# Patient Record
Sex: MALE | Race: WHITE | NOT HISPANIC OR LATINO | Employment: FULL TIME | URBAN - METROPOLITAN AREA
[De-identification: names, ages, dates, MRNs, and addresses within clinical notes are randomized per-mention and may not be internally consistent; named-entity substitution may affect disease eponyms.]

---

## 2022-07-13 ENCOUNTER — HOSPITAL ENCOUNTER (INPATIENT)
Facility: CLINIC | Age: 42
LOS: 2 days | Discharge: HOME | DRG: 897 | End: 2022-07-15
Attending: EMERGENCY MEDICINE | Admitting: INTERNAL MEDICINE
Payer: COMMERCIAL

## 2022-07-13 DIAGNOSIS — K14.8 EDEMA OF THE TONGUE: ICD-10-CM

## 2022-07-13 DIAGNOSIS — S01.512A TONGUE LACERATION, INITIAL ENCOUNTER: ICD-10-CM

## 2022-07-13 DIAGNOSIS — R56.9: ICD-10-CM

## 2022-07-13 DIAGNOSIS — F10.939: ICD-10-CM

## 2022-07-13 LAB
ALBUMIN SERPL-MCNC: 3.7 G/DL (ref 3.4–5)
ALP SERPL-CCNC: 64 U/L (ref 40–150)
ALT SERPL W P-5'-P-CCNC: 159 U/L (ref 0–70)
AMPHETAMINES UR QL SCN: NORMAL
ANION GAP SERPL CALCULATED.3IONS-SCNC: 11 MMOL/L (ref 3–14)
AST SERPL W P-5'-P-CCNC: 271 U/L (ref 0–45)
BARBITURATES UR QL: NORMAL
BASOPHILS # BLD AUTO: 0 10E3/UL (ref 0–0.2)
BASOPHILS NFR BLD AUTO: 1 %
BENZODIAZ UR QL: NORMAL
BILIRUB SERPL-MCNC: 2.3 MG/DL (ref 0.2–1.3)
BUN SERPL-MCNC: 6 MG/DL (ref 7–30)
CALCIUM SERPL-MCNC: 9 MG/DL (ref 8.5–10.1)
CANNABINOIDS UR QL SCN: NORMAL
CHLORIDE BLD-SCNC: 98 MMOL/L (ref 94–109)
CO2 SERPL-SCNC: 24 MMOL/L (ref 20–32)
COCAINE UR QL: NORMAL
CREAT SERPL-MCNC: 0.68 MG/DL (ref 0.66–1.25)
EOSINOPHIL # BLD AUTO: 0 10E3/UL (ref 0–0.7)
EOSINOPHIL NFR BLD AUTO: 1 %
ERYTHROCYTE [DISTWIDTH] IN BLOOD BY AUTOMATED COUNT: 13 % (ref 10–15)
ETHANOL SERPL-MCNC: <0.01 G/DL
GFR SERPL CREATININE-BSD FRML MDRD: >90 ML/MIN/1.73M2
GLUCOSE BLD-MCNC: 129 MG/DL (ref 70–99)
HCT VFR BLD AUTO: 40.4 % (ref 40–53)
HGB BLD-MCNC: 14.5 G/DL (ref 13.3–17.7)
IMM GRANULOCYTES # BLD: 0 10E3/UL
IMM GRANULOCYTES NFR BLD: 0 %
LYMPHOCYTES # BLD AUTO: 0.4 10E3/UL (ref 0.8–5.3)
LYMPHOCYTES NFR BLD AUTO: 11 %
MAGNESIUM SERPL-MCNC: 1.5 MG/DL (ref 1.6–2.3)
MAGNESIUM SERPL-MCNC: 1.9 MG/DL (ref 1.6–2.3)
MCH RBC QN AUTO: 34.3 PG (ref 26.5–33)
MCHC RBC AUTO-ENTMCNC: 35.9 G/DL (ref 31.5–36.5)
MCV RBC AUTO: 96 FL (ref 78–100)
MONOCYTES # BLD AUTO: 0.6 10E3/UL (ref 0–1.3)
MONOCYTES NFR BLD AUTO: 16 %
NEUTROPHILS # BLD AUTO: 2.8 10E3/UL (ref 1.6–8.3)
NEUTROPHILS NFR BLD AUTO: 71 %
NRBC # BLD AUTO: 0 10E3/UL
NRBC BLD AUTO-RTO: 0 /100
OPIATES UR QL SCN: NORMAL
PCP UR QL SCN: NORMAL
PHOSPHATE SERPL-MCNC: 1.9 MG/DL (ref 2.5–4.5)
PHOSPHATE SERPL-MCNC: 2.5 MG/DL (ref 2.5–4.5)
PLATELET # BLD AUTO: 72 10E3/UL (ref 150–450)
POTASSIUM BLD-SCNC: 2.9 MMOL/L (ref 3.4–5.3)
POTASSIUM BLD-SCNC: 3.3 MMOL/L (ref 3.4–5.3)
PROT SERPL-MCNC: 8.2 G/DL (ref 6.8–8.8)
RBC # BLD AUTO: 4.23 10E6/UL (ref 4.4–5.9)
SARS-COV-2 RNA RESP QL NAA+PROBE: NEGATIVE
SODIUM SERPL-SCNC: 133 MMOL/L (ref 133–144)
WBC # BLD AUTO: 3.9 10E3/UL (ref 4–11)

## 2022-07-13 PROCEDURE — 83735 ASSAY OF MAGNESIUM: CPT | Performed by: EMERGENCY MEDICINE

## 2022-07-13 PROCEDURE — 99285 EMERGENCY DEPT VISIT HI MDM: CPT

## 2022-07-13 PROCEDURE — 80307 DRUG TEST PRSMV CHEM ANLYZR: CPT | Performed by: EMERGENCY MEDICINE

## 2022-07-13 PROCEDURE — 258N000003 HC RX IP 258 OP 636: Performed by: PHYSICIAN ASSISTANT

## 2022-07-13 PROCEDURE — 250N000013 HC RX MED GY IP 250 OP 250 PS 637: Performed by: PHYSICIAN ASSISTANT

## 2022-07-13 PROCEDURE — 250N000011 HC RX IP 250 OP 636: Performed by: PHYSICIAN ASSISTANT

## 2022-07-13 PROCEDURE — 84100 ASSAY OF PHOSPHORUS: CPT | Performed by: PHYSICIAN ASSISTANT

## 2022-07-13 PROCEDURE — 250N000011 HC RX IP 250 OP 636: Performed by: EMERGENCY MEDICINE

## 2022-07-13 PROCEDURE — HZ2ZZZZ DETOXIFICATION SERVICES FOR SUBSTANCE ABUSE TREATMENT: ICD-10-PCS | Performed by: PHYSICIAN ASSISTANT

## 2022-07-13 PROCEDURE — 85025 COMPLETE CBC W/AUTO DIFF WBC: CPT | Performed by: EMERGENCY MEDICINE

## 2022-07-13 PROCEDURE — 250N000009 HC RX 250: Performed by: PHYSICIAN ASSISTANT

## 2022-07-13 PROCEDURE — 250N000013 HC RX MED GY IP 250 OP 250 PS 637: Performed by: INTERNAL MEDICINE

## 2022-07-13 PROCEDURE — U0005 INFEC AGEN DETEC AMPLI PROBE: HCPCS | Performed by: EMERGENCY MEDICINE

## 2022-07-13 PROCEDURE — 83735 ASSAY OF MAGNESIUM: CPT | Performed by: PHYSICIAN ASSISTANT

## 2022-07-13 PROCEDURE — 84132 ASSAY OF SERUM POTASSIUM: CPT | Performed by: PHYSICIAN ASSISTANT

## 2022-07-13 PROCEDURE — 120N000001 HC R&B MED SURG/OB

## 2022-07-13 PROCEDURE — 80053 COMPREHEN METABOLIC PANEL: CPT | Performed by: EMERGENCY MEDICINE

## 2022-07-13 PROCEDURE — 36415 COLL VENOUS BLD VENIPUNCTURE: CPT | Performed by: PHYSICIAN ASSISTANT

## 2022-07-13 PROCEDURE — C9803 HOPD COVID-19 SPEC COLLECT: HCPCS

## 2022-07-13 PROCEDURE — 96365 THER/PROPH/DIAG IV INF INIT: CPT

## 2022-07-13 PROCEDURE — 99223 1ST HOSP IP/OBS HIGH 75: CPT | Mod: AI | Performed by: PHYSICIAN ASSISTANT

## 2022-07-13 PROCEDURE — 84100 ASSAY OF PHOSPHORUS: CPT | Performed by: EMERGENCY MEDICINE

## 2022-07-13 PROCEDURE — 96375 TX/PRO/DX INJ NEW DRUG ADDON: CPT

## 2022-07-13 PROCEDURE — 96366 THER/PROPH/DIAG IV INF ADDON: CPT

## 2022-07-13 PROCEDURE — 82077 ASSAY SPEC XCP UR&BREATH IA: CPT | Performed by: EMERGENCY MEDICINE

## 2022-07-13 PROCEDURE — 250N000013 HC RX MED GY IP 250 OP 250 PS 637: Performed by: EMERGENCY MEDICINE

## 2022-07-13 PROCEDURE — 36415 COLL VENOUS BLD VENIPUNCTURE: CPT | Performed by: EMERGENCY MEDICINE

## 2022-07-13 PROCEDURE — 258N000003 HC RX IP 258 OP 636: Performed by: EMERGENCY MEDICINE

## 2022-07-13 PROCEDURE — 96376 TX/PRO/DX INJ SAME DRUG ADON: CPT

## 2022-07-13 PROCEDURE — 250N000009 HC RX 250: Performed by: EMERGENCY MEDICINE

## 2022-07-13 RX ORDER — CYANOCOBALAMIN (VITAMIN B-12) 500 MCG
TABLET ORAL
COMMUNITY
End: 2022-07-13

## 2022-07-13 RX ORDER — CALCIUM CARBONATE 500 MG/1
1000 TABLET, CHEWABLE ORAL 4 TIMES DAILY PRN
Status: DISCONTINUED | OUTPATIENT
Start: 2022-07-13 | End: 2022-07-15 | Stop reason: HOSPADM

## 2022-07-13 RX ORDER — OLANZAPINE 5 MG/1
5-10 TABLET, ORALLY DISINTEGRATING ORAL EVERY 6 HOURS PRN
Status: DISCONTINUED | OUTPATIENT
Start: 2022-07-13 | End: 2022-07-15 | Stop reason: HOSPADM

## 2022-07-13 RX ORDER — DIAZEPAM 5 MG
10 TABLET ORAL EVERY 30 MIN PRN
Status: DISCONTINUED | OUTPATIENT
Start: 2022-07-13 | End: 2022-07-15 | Stop reason: HOSPADM

## 2022-07-13 RX ORDER — ACETAMINOPHEN 325 MG/1
650 TABLET ORAL 2 TIMES DAILY PRN
COMMUNITY

## 2022-07-13 RX ORDER — FOLIC ACID 1 MG/1
2 TABLET ORAL DAILY
COMMUNITY

## 2022-07-13 RX ORDER — DIAZEPAM 10 MG/2ML
5-10 INJECTION, SOLUTION INTRAMUSCULAR; INTRAVENOUS EVERY 30 MIN PRN
Status: DISCONTINUED | OUTPATIENT
Start: 2022-07-13 | End: 2022-07-15 | Stop reason: HOSPADM

## 2022-07-13 RX ORDER — LANOLIN ALCOHOL/MO/W.PET/CERES
1 CREAM (GRAM) TOPICAL
COMMUNITY

## 2022-07-13 RX ORDER — ESCITALOPRAM OXALATE 10 MG/1
10 TABLET ORAL DAILY
COMMUNITY

## 2022-07-13 RX ORDER — CHLORAL HYDRATE 500 MG
CAPSULE ORAL
COMMUNITY

## 2022-07-13 RX ORDER — GABAPENTIN 300 MG/1
900 CAPSULE ORAL EVERY 8 HOURS
Status: DISCONTINUED | OUTPATIENT
Start: 2022-07-13 | End: 2022-07-15 | Stop reason: HOSPADM

## 2022-07-13 RX ORDER — FOLIC ACID 1 MG/1
1 TABLET ORAL DAILY
Status: DISCONTINUED | OUTPATIENT
Start: 2022-07-14 | End: 2022-07-15 | Stop reason: HOSPADM

## 2022-07-13 RX ORDER — LANOLIN ALCOHOL/MO/W.PET/CERES
100 CREAM (GRAM) TOPICAL 2 TIMES DAILY
COMMUNITY

## 2022-07-13 RX ORDER — ACETAMINOPHEN 325 MG/1
650 TABLET ORAL EVERY 6 HOURS PRN
Status: DISCONTINUED | OUTPATIENT
Start: 2022-07-13 | End: 2022-07-15 | Stop reason: HOSPADM

## 2022-07-13 RX ORDER — ACETAMINOPHEN 650 MG/1
650 SUPPOSITORY RECTAL EVERY 6 HOURS PRN
Status: DISCONTINUED | OUTPATIENT
Start: 2022-07-13 | End: 2022-07-15 | Stop reason: HOSPADM

## 2022-07-13 RX ORDER — PROCHLORPERAZINE 25 MG
25 SUPPOSITORY, RECTAL RECTAL EVERY 12 HOURS PRN
Status: DISCONTINUED | OUTPATIENT
Start: 2022-07-13 | End: 2022-07-15 | Stop reason: HOSPADM

## 2022-07-13 RX ORDER — TRAZODONE HYDROCHLORIDE 50 MG/1
50 TABLET, FILM COATED ORAL AT BEDTIME
COMMUNITY

## 2022-07-13 RX ORDER — POTASSIUM CHLORIDE 1500 MG/1
40 TABLET, EXTENDED RELEASE ORAL ONCE
Status: COMPLETED | OUTPATIENT
Start: 2022-07-13 | End: 2022-07-13

## 2022-07-13 RX ORDER — HALOPERIDOL 5 MG/ML
2.5-5 INJECTION INTRAMUSCULAR EVERY 6 HOURS PRN
Status: DISCONTINUED | OUTPATIENT
Start: 2022-07-13 | End: 2022-07-15 | Stop reason: HOSPADM

## 2022-07-13 RX ORDER — MULTIPLE VITAMINS W/ MINERALS TAB 9MG-400MCG
1 TAB ORAL DAILY
Status: DISCONTINUED | OUTPATIENT
Start: 2022-07-14 | End: 2022-07-15 | Stop reason: HOSPADM

## 2022-07-13 RX ORDER — VITAMIN E 268 MG
400 CAPSULE ORAL DAILY
COMMUNITY

## 2022-07-13 RX ORDER — HYDRALAZINE HYDROCHLORIDE 20 MG/ML
10 INJECTION INTRAMUSCULAR; INTRAVENOUS EVERY 4 HOURS PRN
Status: DISCONTINUED | OUTPATIENT
Start: 2022-07-13 | End: 2022-07-15 | Stop reason: HOSPADM

## 2022-07-13 RX ORDER — GABAPENTIN 400 MG/1
400 CAPSULE ORAL 3 TIMES DAILY
COMMUNITY
End: 2022-07-13

## 2022-07-13 RX ORDER — ONDANSETRON 4 MG/1
4 TABLET, ORALLY DISINTEGRATING ORAL EVERY 6 HOURS PRN
Status: DISCONTINUED | OUTPATIENT
Start: 2022-07-13 | End: 2022-07-15 | Stop reason: HOSPADM

## 2022-07-13 RX ORDER — GABAPENTIN 100 MG/1
100 CAPSULE ORAL EVERY 8 HOURS
Status: DISCONTINUED | OUTPATIENT
Start: 2022-07-20 | End: 2022-07-15

## 2022-07-13 RX ORDER — DIAZEPAM 10 MG/2ML
5 INJECTION, SOLUTION INTRAMUSCULAR; INTRAVENOUS EVERY 5 MIN PRN
Status: DISCONTINUED | OUTPATIENT
Start: 2022-07-13 | End: 2022-07-13

## 2022-07-13 RX ORDER — GABAPENTIN 300 MG/1
300 CAPSULE ORAL EVERY 8 HOURS
Status: DISCONTINUED | OUTPATIENT
Start: 2022-07-18 | End: 2022-07-15

## 2022-07-13 RX ORDER — CLONIDINE HYDROCHLORIDE 0.1 MG/1
0.1 TABLET ORAL EVERY 8 HOURS
Status: DISCONTINUED | OUTPATIENT
Start: 2022-07-13 | End: 2022-07-15

## 2022-07-13 RX ORDER — POTASSIUM CHLORIDE 1.5 G/1.58G
40 POWDER, FOR SOLUTION ORAL ONCE
Status: COMPLETED | OUTPATIENT
Start: 2022-07-13 | End: 2022-07-13

## 2022-07-13 RX ORDER — SODIUM CHLORIDE AND POTASSIUM CHLORIDE 150; 900 MG/100ML; MG/100ML
INJECTION, SOLUTION INTRAVENOUS CONTINUOUS
Status: DISCONTINUED | OUTPATIENT
Start: 2022-07-13 | End: 2022-07-15

## 2022-07-13 RX ORDER — LISINOPRIL 10 MG/1
10 TABLET ORAL DAILY
COMMUNITY

## 2022-07-13 RX ORDER — ACAMPROSATE CALCIUM 333 MG/1
666 TABLET, DELAYED RELEASE ORAL 3 TIMES DAILY
Status: ON HOLD | COMMUNITY
End: 2022-07-15

## 2022-07-13 RX ORDER — SILDENAFIL 100 MG/1
100 TABLET, FILM COATED ORAL DAILY PRN
COMMUNITY

## 2022-07-13 RX ORDER — AMOXICILLIN 250 MG
2 CAPSULE ORAL 2 TIMES DAILY PRN
Status: DISCONTINUED | OUTPATIENT
Start: 2022-07-13 | End: 2022-07-15 | Stop reason: HOSPADM

## 2022-07-13 RX ORDER — PANTOPRAZOLE SODIUM 40 MG/1
40 TABLET, DELAYED RELEASE ORAL DAILY
COMMUNITY

## 2022-07-13 RX ORDER — LABETALOL HYDROCHLORIDE 5 MG/ML
10 INJECTION, SOLUTION INTRAVENOUS
Status: DISCONTINUED | OUTPATIENT
Start: 2022-07-13 | End: 2022-07-15 | Stop reason: HOSPADM

## 2022-07-13 RX ORDER — ONDANSETRON 2 MG/ML
4 INJECTION INTRAMUSCULAR; INTRAVENOUS EVERY 6 HOURS PRN
Status: DISCONTINUED | OUTPATIENT
Start: 2022-07-13 | End: 2022-07-15 | Stop reason: HOSPADM

## 2022-07-13 RX ORDER — UBIDECARENONE 100 MG
100 CAPSULE ORAL
COMMUNITY

## 2022-07-13 RX ORDER — MULTIVITAMIN WITH IRON
1 TABLET ORAL DAILY
COMMUNITY
End: 2022-07-13

## 2022-07-13 RX ORDER — PROCHLORPERAZINE MALEATE 10 MG
10 TABLET ORAL EVERY 6 HOURS PRN
Status: DISCONTINUED | OUTPATIENT
Start: 2022-07-13 | End: 2022-07-15 | Stop reason: HOSPADM

## 2022-07-13 RX ORDER — MAGNESIUM OXIDE 400 MG/1
400 TABLET ORAL 2 TIMES DAILY
COMMUNITY

## 2022-07-13 RX ORDER — FLUMAZENIL 0.1 MG/ML
0.2 INJECTION, SOLUTION INTRAVENOUS
Status: DISCONTINUED | OUTPATIENT
Start: 2022-07-13 | End: 2022-07-15 | Stop reason: HOSPADM

## 2022-07-13 RX ORDER — GABAPENTIN 600 MG/1
1200 TABLET ORAL ONCE
Status: COMPLETED | OUTPATIENT
Start: 2022-07-13 | End: 2022-07-13

## 2022-07-13 RX ORDER — ATORVASTATIN CALCIUM 20 MG/1
20 TABLET, FILM COATED ORAL DAILY
COMMUNITY

## 2022-07-13 RX ORDER — PANTOPRAZOLE SODIUM 40 MG/1
40 TABLET, DELAYED RELEASE ORAL
Status: DISCONTINUED | OUTPATIENT
Start: 2022-07-13 | End: 2022-07-15 | Stop reason: HOSPADM

## 2022-07-13 RX ORDER — LIDOCAINE 40 MG/G
CREAM TOPICAL
Status: DISCONTINUED | OUTPATIENT
Start: 2022-07-13 | End: 2022-07-15 | Stop reason: HOSPADM

## 2022-07-13 RX ORDER — NICOTINE 21 MG/24HR
1 PATCH, TRANSDERMAL 24 HOURS TRANSDERMAL DAILY
Status: DISCONTINUED | OUTPATIENT
Start: 2022-07-13 | End: 2022-07-15 | Stop reason: HOSPADM

## 2022-07-13 RX ORDER — POTASSIUM CHLORIDE 1500 MG/1
20 TABLET, EXTENDED RELEASE ORAL DAILY
COMMUNITY

## 2022-07-13 RX ORDER — GABAPENTIN 300 MG/1
600 CAPSULE ORAL EVERY 8 HOURS
Status: DISCONTINUED | OUTPATIENT
Start: 2022-07-16 | End: 2022-07-15 | Stop reason: HOSPADM

## 2022-07-13 RX ORDER — AMOXICILLIN 250 MG
1 CAPSULE ORAL 2 TIMES DAILY PRN
Status: DISCONTINUED | OUTPATIENT
Start: 2022-07-13 | End: 2022-07-15 | Stop reason: HOSPADM

## 2022-07-13 RX ADMIN — GABAPENTIN 900 MG: 300 CAPSULE ORAL at 22:12

## 2022-07-13 RX ADMIN — DIAZEPAM 5 MG: 5 INJECTION, SOLUTION INTRAMUSCULAR; INTRAVENOUS at 14:20

## 2022-07-13 RX ADMIN — DIAZEPAM 5 MG: 5 INJECTION, SOLUTION INTRAMUSCULAR; INTRAVENOUS at 15:53

## 2022-07-13 RX ADMIN — SENNOSIDES AND DOCUSATE SODIUM 2 TABLET: 50; 8.6 TABLET ORAL at 19:52

## 2022-07-13 RX ADMIN — POTASSIUM CHLORIDE 40 MEQ: 1500 TABLET, EXTENDED RELEASE ORAL at 19:52

## 2022-07-13 RX ADMIN — NICOTINE 1 PATCH: 14 PATCH, EXTENDED RELEASE TRANSDERMAL at 15:02

## 2022-07-13 RX ADMIN — FAMOTIDINE 20 MG: 10 INJECTION INTRAVENOUS at 14:30

## 2022-07-13 RX ADMIN — POTASSIUM CHLORIDE 40 MEQ: 1.5 POWDER, FOR SOLUTION ORAL at 18:05

## 2022-07-13 RX ADMIN — CLONIDINE HYDROCHLORIDE 0.1 MG: 0.1 TABLET ORAL at 19:52

## 2022-07-13 RX ADMIN — MELATONIN 5 MG TABLET 5 MG: at 22:13

## 2022-07-13 RX ADMIN — POTASSIUM CHLORIDE AND SODIUM CHLORIDE: 900; 150 INJECTION, SOLUTION INTRAVENOUS at 17:47

## 2022-07-13 RX ADMIN — DIAZEPAM 5 MG: 5 INJECTION, SOLUTION INTRAMUSCULAR; INTRAVENOUS at 10:05

## 2022-07-13 RX ADMIN — GABAPENTIN 1200 MG: 600 TABLET, FILM COATED ORAL at 18:05

## 2022-07-13 RX ADMIN — THIAMINE HYDROCHLORIDE: 100 INJECTION, SOLUTION INTRAMUSCULAR; INTRAVENOUS at 10:41

## 2022-07-13 RX ADMIN — ACETAMINOPHEN 650 MG: 325 TABLET ORAL at 22:13

## 2022-07-13 RX ADMIN — DIAZEPAM 10 MG: 5 TABLET ORAL at 18:05

## 2022-07-13 RX ADMIN — THIAMINE HYDROCHLORIDE: 100 INJECTION, SOLUTION INTRAMUSCULAR; INTRAVENOUS at 18:40

## 2022-07-13 RX ADMIN — PANTOPRAZOLE SODIUM 40 MG: 40 TABLET, DELAYED RELEASE ORAL at 18:05

## 2022-07-13 ASSESSMENT — ACTIVITIES OF DAILY LIVING (ADL)
WALKING_OR_CLIMBING_STAIRS_DIFFICULTY: NO
ADLS_ACUITY_SCORE: 35
ADLS_ACUITY_SCORE: 22
NUMBER_OF_TIMES_PATIENT_HAS_FALLEN_WITHIN_LAST_SIX_MONTHS: 0
DOING_ERRANDS_INDEPENDENTLY_DIFFICULTY: NO
VISION_MANAGEMENT: GLASSES
ADLS_ACUITY_SCORE: 35
DIFFICULTY_EATING/SWALLOWING: NO
DRESSING/BATHING_DIFFICULTY: NO
DIFFICULTY_COMMUNICATING: NO
HEARING_DIFFICULTY_OR_DEAF: NO
ADLS_ACUITY_SCORE: 22
ADLS_ACUITY_SCORE: 35
WEAR_GLASSES_OR_BLIND: YES
FALL_HISTORY_WITHIN_LAST_SIX_MONTHS: NO
CHANGE_IN_FUNCTIONAL_STATUS_SINCE_ONSET_OF_CURRENT_ILLNESS/INJURY: NO
TOILETING_ISSUES: NO
ADLS_ACUITY_SCORE: 22
ADLS_ACUITY_SCORE: 35
CONCENTRATING,_REMEMBERING_OR_MAKING_DECISIONS_DIFFICULTY: NO

## 2022-07-13 ASSESSMENT — ENCOUNTER SYMPTOMS
HEADACHES: 1
SEIZURES: 1

## 2022-07-13 NOTE — ED TRIAGE NOTES
Witnessed 1-2min tonic clonic seizure at work. Pt is postictal currently and unable to give clear answers. Pt from [place], Wife notified.

## 2022-07-13 NOTE — ED PROVIDER NOTES
History   Chief Complaint:  Seizures       HPI   Noble Carty is a 41 year old male with history of alcohol withdrawal seizures who presents with seizures. Per the patient, he remembers waking up on the floor. He bit his tongue which is now swollen. He does not think he hit his head but reports a bad headache. He his here on business from [place]. Per his wife over the phone, this is his second alcohol related seizure. The last being in September 2021. He had been drinking at home but stopped when he travelled here.     Wife: Janneth [phone]    Review of Systems   Skin:        Positive for swelling of tongue.    Neurological: Positive for seizures and headaches.   All other systems reviewed and are negative.      Allergies:  The patient has no known allergies.     Medications:  Acamprosate  Atorvastatin  Folic Acid  Gabapentin  Potassium Aminobenzoate     Past Medical History:     Alcohol Abuse  Alcohol Withdrawal Seizures   Alcohol Hepatitis  Coronary Artery Calcification  Fatty Liver  GERD  Hepatic Steatosis  Hepatomegaly  Hyperlipidemia  Hypertension  Hypercalcemia   Hypokalemia  PEARL  Prediabetes  Pulmonary Granuloma  Severe Obestity   Substance Use Disorder  Throbocytopenia    Past Surgical History:  Nasal Septum Surgery    Past Family History  CAD, MI - Father    Social History:  Patient is from [place].   Patient's wife Janneth is in contact over the phone: [phone]    Physical Exam         Physical Exam     Patient Vitals for the past 24 hrs:   BP Temp Temp src Pulse Resp SpO2 Weight   07/13/22 1600 (!) 134/95 -- -- 66 19 94 % --   07/13/22 1430 -- -- -- -- 18 -- --   07/13/22 1400 (!) 156/104 -- -- 73 9 98 % --   07/13/22 1330 135/89 -- -- 58 18 96 % --   07/13/22 1130 (!) 164/107 -- -- 73 14 97 % --   07/13/22 1119 -- -- -- 78 9 97 % --   07/13/22 1046 -- -- -- 65 12 95 % --   07/13/22 0947 (!) 148/96 97.6  F (36.4  C) Temporal 70 18 94 % 99.8 kg (220 lb)       General: Alert and Interactive.    Head: No signs of trauma.   Mouth/Throat: Oropharynx is clear and moist. Tongue swollen both sides, 2cm laceration in the middle.   Eyes: Conjunctivae are normal. Pupils are equal, round, and reactive to light.   Neck: Normal range of motion. No nuchal rigidity.   CV: Normal rate and regular rhythm.    Resp: Effort normal and breath sounds normal. No respiratory distress.   GI: Soft. There is no tenderness or guarding.   MSK: Normal range of motion. no edema.   Neuro: The patient is alert and oriented to person, place, and time.  PERRLA, EOMI, strength in upper/lower extremities normal and symmetrical.   Sensation normal. Speech normal.  GCS eye subscore is 4. GCS verbal subscore is 5. GCS motor subscore is 6.   Skin: Skin is warm and dry. No rash noted.   Psych: normal mood and affect. behavior is normal.     Emergency Department Course     Laboratory:  Labs Ordered and Resulted from Time of ED Arrival to Time of ED Departure   COMPREHENSIVE METABOLIC PANEL - Abnormal       Result Value    Sodium 133      Potassium 2.9 (*)     Chloride 98      Carbon Dioxide (CO2) 24      Anion Gap 11      Urea Nitrogen 6 (*)     Creatinine 0.68      Calcium 9.0      Glucose 129 (*)     Alkaline Phosphatase 64       (*)      (*)     Protein Total 8.2      Albumin 3.7      Bilirubin Total 2.3 (*)     GFR Estimate >90     MAGNESIUM - Abnormal    Magnesium 1.5 (*)    PHOSPHORUS - Abnormal    Phosphorus 1.9 (*)    CBC WITH PLATELETS AND DIFFERENTIAL - Abnormal    WBC Count 3.9 (*)     RBC Count 4.23 (*)     Hemoglobin 14.5      Hematocrit 40.4      MCV 96      MCH 34.3 (*)     MCHC 35.9      RDW 13.0      Platelet Count 72 (*)     % Neutrophils 71      % Lymphocytes 11      % Monocytes 16      % Eosinophils 1      % Basophils 1      % Immature Granulocytes 0      NRBCs per 100 WBC 0      Absolute Neutrophils 2.8      Absolute Lymphocytes 0.4 (*)     Absolute Monocytes 0.6      Absolute Eosinophils 0.0      Absolute  Basophils 0.0      Absolute Immature Granulocytes 0.0      Absolute NRBCs 0.0     ETHYL ALCOHOL LEVEL - Normal    Alcohol ethyl <0.01     DRUG ABUSE SCREEN 77 URINE (FL, RH, SH) - Normal    Amphetamines Urine Screen Negative      Barbiturates Urine Screen Negative      Benzodiazepines Urine Screen Negative      Cannabinoids Urine Screen Negative      Cocaine Urine Screen Negative      Opiates Urine Screen Negative      PCP Urine Screen Negative     COVID-19 VIRUS (CORONAVIRUS) BY PCR - Normal    SARS CoV2 PCR Negative        Emergency Department Course:    Reviewed:  I reviewed nursing notes, vitals and past medical history    Assessments:  1003 I obtained history and examined the patient as noted above.   1120 I rechecked the patient and explained findings.     Consults:  055 I spoke to Dr. Scanlon of the hospitalist service.     Interventions:  1005 diazepam (VALIUM) injection 5 mg  1041 sodium chloride 0.9 % 1,000 mL with Infuvite Adult 10 mL, thiamine 100 mg, folic acid 1 mg infusion  1600   KCl 40 MEQ    Disposition:  The patient was admitted to the hospital under the care of Dr. Scanlon.     Impression & Plan     Medical Decision Making:  Noble Carty is a 41 year old male who presents for evaluation of altered mental status.  The evaluation of altered mental status in this situation involved consideration of a broad differential which includes the following:  A primary neurologic cause such as, Stroke, Seizure, or Bleed  Systemic Disease in broad catergories such as,    Cardiovascular (Hypotension, low cardiac output),    Pulmonary (Hypoxia),    Renal (Uremia, Hypo/Hypernatremia, Hypercalcemia),    Liver (Hepatic encephalopathy),    Endocrine (hypoglycemia, thyroid dysfunction)   Infection: CNS (meningitis/encephalitis), or systemic infection (anything - PNA, UTIs, denisha in the elderly)  Drug Intoxication or Withdrawal: Opiates, BZDs, illicit drugs, EtOH intoxication or withdrawal  A psychiatric  disorder or dementia are diagnoses of exclusion.    Patient's symptoms are likely secondary to his seizure which is likely secondary to alcohol withdrawal.  The patient was stabilized using the above medications and he will be admitted to the hospital for further evaluation and treatment.    Diagnosis:    ICD-10-CM    1. Alcohol withdrawal seizure with complication, with unspecified complication (H)  F10.239     R56.9    2. Edema of the tongue  K14.8    3. Tongue laceration, initial encounter  S01.512A      Scribe Disclosure:  I, Valentino Russ, am serving as a scribe at 9:59 AM on 7/13/2022 to document services personally performed by Carlos Alberto Priest MD based on my observations and the provider's statements to me.        Carlos Alberto Priest MD  07/13/22 1806

## 2022-07-13 NOTE — PLAN OF CARE
Goal Outcome Evaluation:      Summary: ETOH/seizure/tongue laceration     DATE & TIME: 7/13/22 (1735-6623)   Cognitive Concerns/ Orientation : A&Ox4   BEHAVIOR & AGGRESSION TOOL COLOR: Green  CIWA SCORE: 8 (Valium 10 mg oral given x1)    ABNL VS/O2: VSS, RA, continuous pulse ox  MOBILITY: 1 assistance, bed alarm in place  PAIN MANAGMENT: 3/10 tongue pain  DIET: Mechanical soft diet  BOWEL/BLADDER: Urinal at bedside  ABNL LAB/BG: K 2.9 (40 meq given), MG 1.5 & Phosphorus 1.9-awaiting recheck level, ,    DRAIN/DEVICES: IVF's infusing NS with 20 KCL @125/hr, banana bag ordered  TELEMETRY RHYTHM: NSR  SKIN: Flushed, laceration tongue  TESTS/PROCEDURES: None  D/C DAY/GOALS/PLACE: Pending improvement   OTHER IMPORTANT INFO: Psyche consult pending, seizure pads in place, educated pt to call before getting out of bed.

## 2022-07-13 NOTE — H&P
[facility]    History and Physical  Hospitalist       Date of Admission:  7/13/2022  Date of Service (when I saw the patient): 07/13/22    Assessment & Plan   Noble Carty is a 41 year old male with history of hypertension, hyperlipidemia, obesity, hepatic steatosis, alcohol use disorder, alcohol withdrawal seizures who presents with seizures.   Traveled here from [place] for work, and is 2 days from last alcoholic drink, which precipitated a withdrawal seizure.  He is admitted to the hospital for further management.    Alcohol withdrawal with seizure  Alcohol use disorder  Patient has history of alcohol withdrawal seizures.  He is traveling here for work and stopped drinking approximately 2 days ago.  He was reportedly witnessed to have a seizure, and was brought into the ER.  There are some previous notes in care everywhere.  Outside hospital head CT from his last admission 9/2021 did not show any acute pathology.  His urine tox screen here is negative.  Received 5 mg IV diazepam in the ER.  -- Seizure precautions  -- CIWA protocol with symptom triggered diazepam dosing  -- Gabapentin taper while hospitalized per protocol  -- IV multivitamin, followed by p.o. folate, thiamine, multivitamin  -- Replace electrolytes per protocol  -- Monitor telemetry  -- Monitor hemodynamics closely  -- Protonix daily  -- Clear liquid diet, can advance to soft diet if no further seizures today and patient remains alert.  -- Psych/chemical dependency and SW consult.  It looks like he was on Campral prior to admission.    Tongue laceration  -- Monitor for worsening swelling.  Patient is able to swallow able to breathe normally.  -- Diet as above  --Tylenol prn.    Hypokalemia, hypomagnesemia, hypophosphatemia  Potassium 2.9.  Magnesium 1.5, phosphorus 1.9.  Likely related to alcohol use disorder.  -- Ordered electrolyte replacement protocols    Elevated LFTs, likely alcoholic hepatitis  -- Monitor  LFTs  -- IV hydration, treat alcohol withdrawal as above.  -- Encourage alcohol cessation    Hypertension  Hyperlipidemia  --Awaiting pharmacy reconciliation, would plan to resume PTA lisinopril, Lipitor  --PRN IV labetalol and hydralazine for SBP greater than 180    Mild leukopenia  Thrombocytopenia   WBC 3.9.  Platelet count 72.  -- Likely bone marrow suppression from alcohol use disorder.  -- CBC in AM.  Monitor for bleeding.      Diet: Clear liquid diet  DVT Prophylaxis: Pneumatic Compression Devices   Forman Catheter: Not present  Code Status: Full code  Disposition Plan       Inpatient status, anticipate at least 2+ days of hospitalization for alcohol withdrawal seizures, along with concern that patient is at risk for acute decompensation from his alcohol withdrawal.  He also has multiple electrolyte abnormalities, and requires close hospital monitoring.    Entered: FRANCY Finley 07/13/2022, 10:58 AM          The patient has been discussed with Dr. Scanlon, who agrees with the assessment and plan at this time.     Securely message with the Vocera Web Console (learn more here)  Text page via [employer]         FRANCY Finley    Primary Care Physician   Not on file     Chief Complaint   Seizure    History is obtained from the patient and \ED provider reports.    History of Present Illness   Noble Carty is a 41 year old male with history of hypertension, hyperlipidemia, obesity, hepatic steatosis, alcohol use disorder, alcohol withdrawal seizures who presents with seizures.   Patient lives in [place], but was traveling here to [place] for work.  He was in the office this morning, and per the patient, he remembers waking up on the floor.  He does not remember any preceding symptoms or report feeling ill this morning.  He is unsure if his fall was witnessed.  He does not know if he hit his head, but reports headache.  He bit his tongue which is now quite swollen.  The ER  provider contacted the patient's wife over the phone, and it was reported that this is his second alcohol-related seizure.  The last being in September 2021.  He had been drinking at home, reportedly daily, but stopped when he traveled here for work.  Last drink was 2 days ago per patient.      In the ER, patient was evaluated by Dr. Priest. He has a /96, pulse 70, temperature 97.6, RR 18, O2 saturation 94%.  He appears to be hemodynamically stable.  He is alert, but currently postictal, able to give limited answers to questions.  Symptoms positive for headache, tremors, anxiety, and confusion.  He denies any recent illness, fever, chills, flulike symptoms.  Denies any illicit drug use.  Denies any chest pain, shortness of breath, palpitations, abdominal pain, nausea, vomiting, diarrhea, dysuria.  No focal weakness or numbness.  ER provider has contacted the patient's spouse.    Past Medical History    I have reviewed this patient's medical history and updated it with pertinent information if needed.    Hypertension    Hyperlipidemia    Obesity    Alcohol use disorder    Hepatic steatosis    Alcohol withdrawal seizures    GERD     Sleep apnea    Anemia    Thrombocytopenia    Pulmonary nodule    Prediabetes      Past Surgical History   I have reviewed this patient's surgical history and updated it with pertinent information if needed.    No pertinent past surgeries reported    Social History   I have reviewed this patient's social history and updated it with pertinent information if needed.  Patient lives in [place] with his wife.  Her contact information is in the chart.  He drinks alcohol daily.  He is a tobacco user.  Reportedly uses marijuana occasionally.  Social History     Tobacco Use     Smoking status: Current Every Day Smoker     Types: Vaping Device     Smokeless tobacco: Never Used   Substance Use Topics     Alcohol use: Yes     Comment: drinks 1/2 bottle of vodka daily     Drug use: Yes     Types:  Marijuana       Family History   I have reviewed this patient's family history using chart review and updated it with pertinent information if needed.   Medical History Relation Comments   Coronary artery disease Father  MI   Cancer Maternal Grandmother  breast   Diabetes Maternal Grandmother      Coronary artery disease Paternal Grandfather        Family History  Relation Status Comments   Father       Maternal Grandfather       Maternal Grandmother       Mother  Alive     Paternal Grandfather       Paternal Grandmother  Alive         Medications   Prior to Admission Medications   Prescriptions Last Dose Informant Patient Reported? Taking?   Multiple Vitamin (THERAPEUTIC MULTIVITAMIN PO)   Yes No   Sig: Take 1 tablet by mouth daily   acamprosate (CAMPRAL) 333 MG EC tablet   Yes No   Sig: Take 666 mg by mouth 3 times daily   acetaminophen (TYLENOL) 325 MG tablet   Yes No   Sig: Take 650 mg by mouth 2 times daily as needed for mild pain   atorvastatin (LIPITOR) 20 MG tablet   Yes No   Sig: Take 20 mg by mouth daily   cholecalciferol (VITAMIN D3) 125 mcg (5000 units) capsule   Yes No   Sig: Take 125 mcg by mouth   co-enzyme Q-10 100 MG CAPS capsule   Yes No   Sig: Take 100 mg by mouth   escitalopram (LEXAPRO) 10 MG tablet   Yes No   Sig: Take 10 mg by mouth daily   fish oil-omega-3 fatty acids 1000 MG capsule   Yes No   folic acid (FOLVITE) 1 MG tablet   Yes No   Sig: Take 2 mg by mouth daily   lisinopril (ZESTRIL) 10 MG tablet   Yes No   Sig: Take 10 mg by mouth daily   magnesium oxide (MAG-OX) 400 MG tablet   Yes No   Sig: Take 400 mg by mouth 2 times daily   melatonin 3 MG tablet   Yes No   Sig: Take 1 mg by mouth nightly as needed for sleep   pantoprazole (PROTONIX) 40 MG EC tablet   Yes No   Sig: Take 40 mg by mouth daily   potassium chloride ER (KLOR-CON M) 20 MEQ CR tablet   Yes No   Sig: Take 20 mEq by mouth daily   sildenafil (VIAGRA) 100 MG tablet   Yes No   Sig: Take 100 mg  by mouth daily as needed (ED)   thiamine (B-1) 100 MG tablet   Yes No   Sig: Take 100 mg by mouth 2 times daily   traZODone (DESYREL) 50 MG tablet   Yes No   Sig: Take 50 mg by mouth At Bedtime   vitamin E (TOCOPHEROL) 400 units (180 mg) capsule   Yes No   Sig: Take 400 Units by mouth daily      Facility-Administered Medications: None     Allergies   No Known Allergies      Review of Systems   The 10 point Review of Systems is negative other than noted in the HPI.    Physical Exam   Temp: 97.6  F (36.4  C) Temp src: Temporal BP: (!) 148/96 Pulse: 65   Resp: 12 SpO2: 95 %      Vital Signs with Ranges  Temp:  [97.6  F (36.4  C)] 97.6  F (36.4  C)  Pulse:  [65-70] 65  Resp:  [12-18] 12  BP: (148)/(96) 148/96  SpO2:  [94 %-95 %] 95 %  220 lbs 0 oz    Constitutional: Awake, alert, cooperative, no apparent distress.  Tremors noted.  ENT: Normocephalic, without obvious abnormality, atraumatic, oropharynx is clear and moist.  Tongue is significantly swollen on both sides, with laceration in the middle.  Posterior pharynx clear.  Eyes pupils are equal, round and reactive to light; extra occular movements intact.  Normal sclera.    Neck: Supple, symmetrical, trachea midline, no adenopathy.  Pulmonary: No increased work of breathing, good air exchange, clear to auscultation bilaterally, no crackles or wheezing.  Cardiovascular: Regular rate and rhythm, normal S1 and S2, and no murmur noted.  GI: Normal bowel sounds, soft, non-distended, non-tender.    Skin/Integumen: Dry, no rashes on exposed skin.  Neuro: Tremor noted in both hands.  CN II-XII grossly intact.  Moves all 4 extremities equally with normal strength.  Normal coordination.  No focal deficits.  Psych: Alert, oriented to self, place, situation.  Restricted affect, normal mood.  Answers questions briefly but appropriately.  Extremities: No lower extremity edema noted. Dorsal pedal pulses and posterior tibial pulses palpable.      Data   Data reviewed today:  I  personally reviewed all labs and imaging reports from this encounter.  Recent Labs   Lab 07/13/22  1041 07/13/22  1010   WBC  --  3.9*   HGB  --  14.5   MCV  --  96   PLT  --  72*     --    POTASSIUM 2.9*  --    CHLORIDE 98  --    CO2 24  --    BUN 6*  --    CR 0.68  --    ANIONGAP 11  --    ZAIRA 9.0  --    *  --    ALBUMIN 3.7  --    PROTTOTAL 8.2  --    BILITOTAL 2.3*  --    ALKPHOS 64  --    *  --    *  --        Results for orders placed or performed during the hospital encounter of 07/13/22 (from the past 24 hour(s))   CBC with platelets differential    Narrative    The following orders were created for panel order CBC with platelets differential.  Procedure                               Abnormality         Status                     ---------                               -----------         ------                     CBC with platelets and d...[[id number]]  Abnormal            Final result                 Please view results for these tests on the individual orders.   CBC with platelets and differential   Result Value Ref Range    WBC Count 3.9 (L) 4.0 - 11.0 10e3/uL    RBC Count 4.23 (L) 4.40 - 5.90 10e6/uL    Hemoglobin 14.5 13.3 - 17.7 g/dL    Hematocrit 40.4 40.0 - 53.0 %    MCV 96 78 - 100 fL    MCH 34.3 (H) 26.5 - 33.0 pg    MCHC 35.9 31.5 - 36.5 g/dL    RDW 13.0 10.0 - 15.0 %    Platelet Count 72 (L) 150 - 450 10e3/uL    % Neutrophils 71 %    % Lymphocytes 11 %    % Monocytes 16 %    % Eosinophils 1 %    % Basophils 1 %    % Immature Granulocytes 0 %    NRBCs per 100 WBC 0 <1 /100    Absolute Neutrophils 2.8 1.6 - 8.3 10e3/uL    Absolute Lymphocytes 0.4 (L) 0.8 - 5.3 10e3/uL    Absolute Monocytes 0.6 0.0 - 1.3 10e3/uL    Absolute Eosinophils 0.0 0.0 - 0.7 10e3/uL    Absolute Basophils 0.0 0.0 - 0.2 10e3/uL    Absolute Immature Granulocytes 0.0 <=0.4 10e3/uL    Absolute NRBCs 0.0 10e3/uL   Comprehensive metabolic panel   Result Value Ref Range    Sodium 133 133 - 144 mmol/L     Potassium 2.9 (L) 3.4 - 5.3 mmol/L    Chloride 98 94 - 109 mmol/L    Carbon Dioxide (CO2) 24 20 - 32 mmol/L    Anion Gap 11 3 - 14 mmol/L    Urea Nitrogen 6 (L) 7 - 30 mg/dL    Creatinine 0.68 0.66 - 1.25 mg/dL    Calcium 9.0 8.5 - 10.1 mg/dL    Glucose 129 (H) 70 - 99 mg/dL    Alkaline Phosphatase 64 40 - 150 U/L     (H) 0 - 45 U/L     (H) 0 - 70 U/L    Protein Total 8.2 6.8 - 8.8 g/dL    Albumin 3.7 3.4 - 5.0 g/dL    Bilirubin Total 2.3 (H) 0.2 - 1.3 mg/dL    GFR Estimate >90 >60 mL/min/1.73m2   Alcohol ethyl   Result Value Ref Range    Alcohol ethyl <0.01 <=0.01 g/dL   Magnesium   Result Value Ref Range    Magnesium 1.5 (L) 1.6 - 2.3 mg/dL   Phosphorus   Result Value Ref Range    Phosphorus 1.9 (L) 2.5 - 4.5 mg/dL   Asymptomatic COVID-19 Virus (Coronavirus) by PCR Nasopharyngeal    Specimen: Nasopharyngeal; Swab   Result Value Ref Range    SARS CoV2 PCR Negative Negative    Narrative    Testing was performed using the Xpert Xpress SARS-CoV-2 Assay on the   Cepheid Gene-Xpert Instrument Systems. Additional information about   this Emergency Use Authorization (EUA) assay can be found via the Lab   Guide. This test should be ordered for the detection of SARS-CoV-2 in   individuals who meet SARS-CoV-2 clinical and/or epidemiological   criteria. Test performance is unknown in asymptomatic patients. This   test is for in vitro diagnostic use under the FDA EUA for   laboratories certified under CLIA to perform high complexity testing.   This test has not been FDA cleared or approved. A negative result   does not rule out the presence of PCR inhibitors in the specimen or   target RNA in concentration below the limit of detection for the   assay. The possibility of a false negative should be considered if   the patient's recent exposure or clinical presentation suggests   COVID-19. This test was validated by the [facility]. This laboratory is certified under the Clinical  Laboratory Improvement Amendments of 1988 (CLIA-88) as qualified to perform high complexity laboratory testing.     Urine Drugs of Abuse Screen    Narrative    The following orders were created for panel order Urine Drugs of Abuse Screen.  Procedure                               Abnormality         Status                     ---------                               -----------         ------                     Drug abuse screen 77 uri...[[id number]]  Normal              Final result                 Please view results for these tests on the individual orders.   Drug abuse screen 77 urine (FL, RH, SH)   Result Value Ref Range    Amphetamines Urine Screen Negative Screen Negative    Barbiturates Urine Screen Negative Screen Negative    Benzodiazepines Urine Screen Negative Screen Negative    Cannabinoids Urine Screen Negative Screen Negative    Cocaine Urine Screen Negative Screen Negative    Opiates Urine Screen Negative Screen Negative    PCP Urine Screen Negative Screen Negative

## 2022-07-13 NOTE — ED NOTES
Bed: ED14  Expected date:   Expected time:   Means of arrival:   Comments:  [facility] [place] [facility] - 449 - 41 M seizure eta 3970

## 2022-07-13 NOTE — PROGRESS NOTES
RECEIVING UNIT ED HANDOFF REVIEW    ED Nurse Handoff Report was reviewed by: Carmen Farah RN on July 13, 2022 at 5:21 PM

## 2022-07-13 NOTE — ED NOTES
[facility]  ED Nurse Handoff Report    ED Chief complaint: Seizures      ED Diagnosis:   Final diagnoses:   Alcohol withdrawal seizure with complication, with unspecified complication (H)   Edema of the tongue   Tongue laceration, initial encounter       Code Status: Full Code    Allergies: No Known Allergies    Patient Story: Pt had witnessed 1-2minute seizure at work  Focused Assessment:  Per wife this is 2nd seizure pt has had. Wife reports he drinks 1/2 bottle of vodka daily. Pt postictal upon arrival. Pt was at work and witnessed to have a 1-2 minute seizure. Pt was unresponsive,  Tonic clonic, bit through tongue. Pt tongue is very swollen and bruised. Ice is what I have been giving him due to concern for choking from the amount of swellng noted. Pt unable to report to RN if he drinks daily, but per wife he does. ETOH on arrival was negative. Pt CIWA is 19. Pt is positive for HA, tremors, anxiety and confusion.  Results for orders placed or performed during the hospital encounter of 07/13/22   Comprehensive metabolic panel     Status: Abnormal   Result Value Ref Range    Sodium 133 133 - 144 mmol/L    Potassium 2.9 (L) 3.4 - 5.3 mmol/L    Chloride 98 94 - 109 mmol/L    Carbon Dioxide (CO2) 24 20 - 32 mmol/L    Anion Gap 11 3 - 14 mmol/L    Urea Nitrogen 6 (L) 7 - 30 mg/dL    Creatinine 0.68 0.66 - 1.25 mg/dL    Calcium 9.0 8.5 - 10.1 mg/dL    Glucose 129 (H) 70 - 99 mg/dL    Alkaline Phosphatase 64 40 - 150 U/L     (H) 0 - 45 U/L     (H) 0 - 70 U/L    Protein Total 8.2 6.8 - 8.8 g/dL    Albumin 3.7 3.4 - 5.0 g/dL    Bilirubin Total 2.3 (H) 0.2 - 1.3 mg/dL    GFR Estimate >90 >60 mL/min/1.73m2   Alcohol ethyl     Status: Normal   Result Value Ref Range    Alcohol ethyl <0.01 <=0.01 g/dL   Magnesium     Status: Abnormal   Result Value Ref Range    Magnesium 1.5 (L) 1.6 - 2.3 mg/dL   Phosphorus     Status: Abnormal   Result Value Ref Range    Phosphorus 1.9 (L) 2.5 - 4.5 mg/dL   CBC  with platelets and differential     Status: Abnormal   Result Value Ref Range    WBC Count 3.9 (L) 4.0 - 11.0 10e3/uL    RBC Count 4.23 (L) 4.40 - 5.90 10e6/uL    Hemoglobin 14.5 13.3 - 17.7 g/dL    Hematocrit 40.4 40.0 - 53.0 %    MCV 96 78 - 100 fL    MCH 34.3 (H) 26.5 - 33.0 pg    MCHC 35.9 31.5 - 36.5 g/dL    RDW 13.0 10.0 - 15.0 %    Platelet Count 72 (L) 150 - 450 10e3/uL    % Neutrophils 71 %    % Lymphocytes 11 %    % Monocytes 16 %    % Eosinophils 1 %    % Basophils 1 %    % Immature Granulocytes 0 %    NRBCs per 100 WBC 0 <1 /100    Absolute Neutrophils 2.8 1.6 - 8.3 10e3/uL    Absolute Lymphocytes 0.4 (L) 0.8 - 5.3 10e3/uL    Absolute Monocytes 0.6 0.0 - 1.3 10e3/uL    Absolute Eosinophils 0.0 0.0 - 0.7 10e3/uL    Absolute Basophils 0.0 0.0 - 0.2 10e3/uL    Absolute Immature Granulocytes 0.0 <=0.4 10e3/uL    Absolute NRBCs 0.0 10e3/uL   CBC with platelets differential     Status: Abnormal    Narrative    The following orders were created for panel order CBC with platelets differential.  Procedure                               Abnormality         Status                     ---------                               -----------         ------                     CBC with platelets and d...[[id number]]  Abnormal            Final result                 Please view results for these tests on the individual orders.   Urine Drugs of Abuse Screen     Status: None ()    Narrative    The following orders were created for panel order Urine Drugs of Abuse Screen.  Procedure                               Abnormality         Status                     ---------                               -----------         ------                     Drug abuse screen 77 uri...[[id number]]                                                   Please view results for these tests on the individual orders.         Treatments and/or interventions provided: seizure pads placed on bed, valium given, vitamin bag started.  Patient's response to  treatments and/or interventions: no current seizure activity at this tie    To be done/followed up on inpatient unit:  N/A    Does this patient have any cognitive concerns?: Alcohol/Drugs temporary cognitive concerns    Activity level - Baseline/Home:  Independent  Activity Level - Current:   Stand with assist x2    Patient's Preferred language: English   Needed?: No    Isolation: None  Infection: Not Applicable  Patient tested for COVID 19 prior to admission: YES  Bariatric?: No    Vital Signs:   Vitals:    07/13/22 0947 07/13/22 1046 07/13/22 1119   BP: (!) 148/96     Pulse: 70 65 78   Resp: 18 12 9   Temp: 97.6  F (36.4  C)     TempSrc: Temporal     SpO2: 94% 95% 97%   Weight: 99.8 kg (220 lb)         Cardiac Rhythm: NSR    Was the PSS-3 completed:   Yes  What interventions are required if any?  N/A             Family Comments: Pt is from out of town ([place]) and has no family present  OBS brochure/video discussed/provided to patient/family: N/A              Name of person given brochure if not patient: N/A              Relationship to patient: N/A    For the majority of the shift this patient's behavior was Green.   Behavioral interventions performed were N/A.    ED NURSE PHONE NUMBER: [phone]

## 2022-07-14 LAB
ALBUMIN SERPL-MCNC: 3.5 G/DL (ref 3.4–5)
ALP SERPL-CCNC: 65 U/L (ref 40–150)
ALT SERPL W P-5'-P-CCNC: 127 U/L (ref 0–70)
ANION GAP SERPL CALCULATED.3IONS-SCNC: 8 MMOL/L (ref 3–14)
AST SERPL W P-5'-P-CCNC: 174 U/L (ref 0–45)
BILIRUB SERPL-MCNC: 3 MG/DL (ref 0.2–1.3)
BUN SERPL-MCNC: 7 MG/DL (ref 7–30)
CALCIUM SERPL-MCNC: 9.1 MG/DL (ref 8.5–10.1)
CHLORIDE BLD-SCNC: 101 MMOL/L (ref 94–109)
CO2 SERPL-SCNC: 23 MMOL/L (ref 20–32)
CREAT SERPL-MCNC: 0.63 MG/DL (ref 0.66–1.25)
ERYTHROCYTE [DISTWIDTH] IN BLOOD BY AUTOMATED COUNT: 13 % (ref 10–15)
GFR SERPL CREATININE-BSD FRML MDRD: >90 ML/MIN/1.73M2
GLUCOSE BLD-MCNC: 101 MG/DL (ref 70–99)
HCT VFR BLD AUTO: 44.2 % (ref 40–53)
HGB BLD-MCNC: 15.1 G/DL (ref 13.3–17.7)
INR PPP: 1.24 (ref 0.85–1.15)
MCH RBC QN AUTO: 34.6 PG (ref 26.5–33)
MCHC RBC AUTO-ENTMCNC: 34.2 G/DL (ref 31.5–36.5)
MCV RBC AUTO: 101 FL (ref 78–100)
PLATELET # BLD AUTO: 51 10E3/UL (ref 150–450)
POTASSIUM BLD-SCNC: 3.6 MMOL/L (ref 3.4–5.3)
POTASSIUM BLD-SCNC: 3.9 MMOL/L (ref 3.4–5.3)
PROT SERPL-MCNC: 7.9 G/DL (ref 6.8–8.8)
RBC # BLD AUTO: 4.37 10E6/UL (ref 4.4–5.9)
SODIUM SERPL-SCNC: 132 MMOL/L (ref 133–144)
WBC # BLD AUTO: 3.1 10E3/UL (ref 4–11)

## 2022-07-14 PROCEDURE — 250N000013 HC RX MED GY IP 250 OP 250 PS 637: Performed by: EMERGENCY MEDICINE

## 2022-07-14 PROCEDURE — 120N000001 HC R&B MED SURG/OB

## 2022-07-14 PROCEDURE — 250N000011 HC RX IP 250 OP 636: Performed by: EMERGENCY MEDICINE

## 2022-07-14 PROCEDURE — 36415 COLL VENOUS BLD VENIPUNCTURE: CPT | Performed by: INTERNAL MEDICINE

## 2022-07-14 PROCEDURE — 80053 COMPREHEN METABOLIC PANEL: CPT | Performed by: INTERNAL MEDICINE

## 2022-07-14 PROCEDURE — 85610 PROTHROMBIN TIME: CPT | Performed by: PHYSICIAN ASSISTANT

## 2022-07-14 PROCEDURE — 36415 COLL VENOUS BLD VENIPUNCTURE: CPT | Performed by: PHYSICIAN ASSISTANT

## 2022-07-14 PROCEDURE — 250N000013 HC RX MED GY IP 250 OP 250 PS 637: Performed by: PHYSICIAN ASSISTANT

## 2022-07-14 PROCEDURE — 250N000013 HC RX MED GY IP 250 OP 250 PS 637: Performed by: INTERNAL MEDICINE

## 2022-07-14 PROCEDURE — 85027 COMPLETE CBC AUTOMATED: CPT | Performed by: PHYSICIAN ASSISTANT

## 2022-07-14 PROCEDURE — 99207 PR NO BILLABLE SERVICE THIS VISIT: CPT | Performed by: FAMILY MEDICINE

## 2022-07-14 PROCEDURE — 99233 SBSQ HOSP IP/OBS HIGH 50: CPT | Performed by: INTERNAL MEDICINE

## 2022-07-14 PROCEDURE — 84132 ASSAY OF SERUM POTASSIUM: CPT | Performed by: PHYSICIAN ASSISTANT

## 2022-07-14 PROCEDURE — 250N000011 HC RX IP 250 OP 636: Performed by: PHYSICIAN ASSISTANT

## 2022-07-14 RX ORDER — LEVETIRACETAM 500 MG/1
500 TABLET ORAL 2 TIMES DAILY
Status: DISCONTINUED | OUTPATIENT
Start: 2022-07-14 | End: 2022-07-15 | Stop reason: HOSPADM

## 2022-07-14 RX ADMIN — POTASSIUM CHLORIDE AND SODIUM CHLORIDE: 900; 150 INJECTION, SOLUTION INTRAVENOUS at 12:06

## 2022-07-14 RX ADMIN — GABAPENTIN 900 MG: 300 CAPSULE ORAL at 21:31

## 2022-07-14 RX ADMIN — GABAPENTIN 900 MG: 300 CAPSULE ORAL at 06:14

## 2022-07-14 RX ADMIN — LEVETIRACETAM 500 MG: 500 TABLET, FILM COATED ORAL at 21:31

## 2022-07-14 RX ADMIN — CLONIDINE HYDROCHLORIDE 0.1 MG: 0.1 TABLET ORAL at 11:37

## 2022-07-14 RX ADMIN — FAMOTIDINE 20 MG: 10 INJECTION INTRAVENOUS at 14:05

## 2022-07-14 RX ADMIN — POTASSIUM CHLORIDE AND SODIUM CHLORIDE: 900; 150 INJECTION, SOLUTION INTRAVENOUS at 23:05

## 2022-07-14 RX ADMIN — FOLIC ACID 1 MG: 1 TABLET ORAL at 08:33

## 2022-07-14 RX ADMIN — POTASSIUM CHLORIDE AND SODIUM CHLORIDE: 900; 150 INJECTION, SOLUTION INTRAVENOUS at 04:42

## 2022-07-14 RX ADMIN — GABAPENTIN 900 MG: 300 CAPSULE ORAL at 14:04

## 2022-07-14 RX ADMIN — PANTOPRAZOLE SODIUM 40 MG: 40 TABLET, DELAYED RELEASE ORAL at 06:14

## 2022-07-14 RX ADMIN — CLONIDINE HYDROCHLORIDE 0.1 MG: 0.1 TABLET ORAL at 21:31

## 2022-07-14 RX ADMIN — CLONIDINE HYDROCHLORIDE 0.1 MG: 0.1 TABLET ORAL at 03:12

## 2022-07-14 RX ADMIN — DIAZEPAM 10 MG: 5 TABLET ORAL at 21:32

## 2022-07-14 RX ADMIN — THIAMINE HCL TAB 100 MG 100 MG: 100 TAB at 08:33

## 2022-07-14 RX ADMIN — FAMOTIDINE 20 MG: 10 INJECTION INTRAVENOUS at 03:11

## 2022-07-14 RX ADMIN — MULTIPLE VITAMINS W/ MINERALS TAB 1 TABLET: TAB at 08:33

## 2022-07-14 RX ADMIN — NICOTINE 1 PATCH: 14 PATCH, EXTENDED RELEASE TRANSDERMAL at 08:33

## 2022-07-14 ASSESSMENT — ACTIVITIES OF DAILY LIVING (ADL)
ADLS_ACUITY_SCORE: 22

## 2022-07-14 NOTE — PLAN OF CARE
Goal Outcome Evaluation:      Summary: ETOH/seizure/tongue laceration  and swelling  DATE & TIME: 7/13/22 (5248-4198)   Cognitive Concerns/ Orientation : A&Ox4   BEHAVIOR & AGGRESSION TOOL COLOR: Green  CIWA SCORE: 5  ABNL VS/O2: /96, other VSS on RA, continuous pulse ox  MOBILITY: Assist x1, bed alarm in place  PAIN MANAGMENT: 4/10 tongue pain managed with PRN Tylenol x1.  DIET: Mechanical soft diet  BOWEL/BLADDER: Urinal at bedside  ABNL LAB/BG: K 3.3>replaces and recheck @ 0045, MG 1.9 & Phosphorus 2.5,  ,    DRAIN/DEVICES: Banana bag infusing , then will continue NS+KCL 20 mEq/L  TELEMETRY RHYTHM: SR  SKIN: Flushed, tongue laceration/swelling/discoloration. TESTS/PROCEDURES: None  D/C DAY/GOALS/PLACE: Pending improvement   OTHER IMPORTANT INFO: Psyche consult pending, seizure pads in place, educated pt to call before getting out of bed.

## 2022-07-14 NOTE — CONSULTS
ADDICTION MEDICINE    Addiction medicine consult received.  Due to a high volume of addiction medicine consults in the setting of reduced staff, consult orders are currently being triaged based on level of acuity and urgency.  For any urgent questions or concerns regarding this patient, please reach out to me through the [employer] and I will do my best to respond as soon as possible (or [facility] paging for patients hospitalized at [facility].)    Chart reviewed.  In brief, patient is a 41-year-old male with a history of hypertension, hyperlipidemia, hepatic steatosis and alcohol use disorder who presented following an alcohol withdrawal seizure, in addition to tongue laceration from seizure.  Patient remains on CIWA and has received a total of 25 mg diazepam, the last dose was yesterday at 1/8/2005 (10 mg.)  He is additionally on clonidine and gabapentin taper per CIWA order set.  Based on H&P history obtained and urine drug screen, there are no other withdrawal syndromes anticipated.  Patient has an elevated INR of 1.24, elevated total bili of 3.0,  (159),  (271) and corrected hypomagnesemia and hypophosphatemia.  Blood pressure has remained high while on clonidine during the withdrawal period.    In the setting of such severe alcohol use disorder, the need to travel back to [place] (based on chart review patient is not from [place]) and his history of seizures, I do recommend that gabapentin be continued at discharge.  His current liver function precludes the use of naltrexone although, if the labs continue to improve further prior to discharge, naltrexone may be considered.  If not, consider talking with patient about adding acamprosate 666 mg 3 times daily since his renal function is normal.    It would be highly unusual although, not unheard of, to have another alcohol withdrawal seizure up to 7-10 days after the last drink.  He is not currently on an antiepileptic.  Given that he will be traveling  back to [place] (especially if by plane), I recommend adding Keppra 500 mg twice daily for 10 days.    Summary of recommendations:  1. Please discussed with patient options for pharmacotherapy to help him not return to drinking.  (Perhaps he has tried some in the past that have been effective.)  2. Consider adding Keppra 500 mg twice daily for 10 days (especially given that he may be traveling back to [place] at discharge.)  3. Continue gabapentin at 600 mg 3 times daily at discharge.   4. Consider acamprosate 666 mg 3 times daily for alcohol use disorder pharmacotherapy.  5. Avoid continuing clonidine at discharge to avoid hypotension or future rebound hypertension.  Consider discontinuing this since withdrawal is subsiding and watch his blood pressure until he is discharged.  Could consider other antihypertensives.  6. Naltrexone may be considered if liver function continues to improve although, I typically recommend that total bilirubin be less than 2.  7. Continue thiamine and folic acid at discharge--consider sending a prescription to the pharmacy since he is out of town  8. Patient needs to follow-up with a primary care provider within 1 week.  9. If he is staying in [place], he may follow-up in the outpatient addiction medicine clinic.    Toshia Simon MD  Addiction Medicine

## 2022-07-14 NOTE — CONSULTS
Care Management Initial Consult    General Information  Assessment completed with:  Chart review.  Per H&P, patient admitted with alcohol withdrawal seizure.  Resides in [place], appears to have a spouse.   No insurance is listed, however the registration staff can follow up with patient to determine insurance status.   Psychiatry consulted along with SW for CD/Rule 25 need.  If patient is interested in completing a CD assessment for their recommendations, a CD consult needs to be entered. This consult will be marked completed though writer will follow for Psychiatry impression/recommedations and follow for any discharge needs thru daily Care Progression rounds.        Primary Care Provider verified and updated as needed:     Readmission within the last 30 days:           Advance Care Planning:            Communication Assessment  Patient's communication style: spoken language (English or Bilingual)    Hearing Difficulty or Deaf: no   Wear Glasses or Blind: yes    Cognitive  Cognitive/Neuro/Behavioral: WDL  Level of Consciousness: alert                   Living Environment:   People in home:       Current living Arrangements:        Able to return to prior arrangements:         Family/Social Support:  Care provided by: self  Provides care for:                  Description of Support System:           Current Resources:   Patient receiving home care services:       Community Resources:    Equipment currently used at home: none  Supplies currently used at home:      Employment/Financial:  Employment Status:          Financial Concerns:             Lifestyle & Psychosocial Needs:  Social Determinants of Health     Tobacco Use: High Risk     Smoking Tobacco Use: Current Every Day Smoker     Smokeless Tobacco Use: Never Used   Alcohol Use: Not on file   Financial Resource Strain: Not on file   Food Insecurity: Not on file   Transportation Needs: Not on file   Physical Activity: Not on file   Stress: Not on file   Social  Connections: Not on file   Intimate Partner Violence: Not on file   Depression: Not on file   Housing Stability: Not on file       Functional Status:  Prior to admission patient needed assistance:              Mental Health Status:          Chemical Dependency Status: Recommend CD consult if patient is interested                Values/Beliefs:  Spiritual, Cultural Beliefs, Jew Practices, Values that affect care:                 Additional Information:      Ana Vergara, LICSW

## 2022-07-14 NOTE — PLAN OF CARE
Goal Outcome Evaluation:                    Summary: ETOH/seizure/tongue laceration  and swelling  DATE & TIME: 7/13/22-7/14/22 3274-7577   Cognitive Concerns/ Orientation: A&Ox4   BEHAVIOR & AGGRESSION TOOL COLOR: Green  CIWA SCORE:   ABNL VS/O2: VSS on RA  MOBILITY: Assist x1, bed alarm in place  PAIN MANAGMENT: Denies  DIET: Mechanical soft diet  BOWEL/BLADDER: Urinal at bedside  ABNL LAB/BG: K 3.6, MG 1.9 & Phosphorus 2.5,  ,    DRAIN/DEVICES: R-PIV infusing NS + Kcl 20mEq at 125mL/hr  TELEMETRY RHYTHM: SR  SKIN: Flushed, tongue laceration/swelling. Refused full skin assessment.  TESTS/PROCEDURES: None  D/C DAY/GOALS/PLACE: Pending improvement   OTHER IMPORTANT INFO: Psych consult pending, seizure pads in place, educated pt to call before getting out of bed.

## 2022-07-14 NOTE — PROGRESS NOTES
[facility]    Hospitalist Progress Note    Interval History   - Patient reports improved withdrawal, had breakfast this morning  - Tongue is swollen but not impairing food intake  - Discussed with patient he has ongoing withdrawal, continue inpatient management today  - Friend/coworker Reymundo at bedside, okay to update per patient    Assessment & Plan   Summary: Noble Carty is a 41 year old male with PMH hypertension, hyperlipidemia, obesity, hepatic steatosis, alcohol use disorder, alcohol withdrawal seizures who was admitted on 7/13/2022 with alcohol withdrawal and alcohol withdrawal seizures.    Alcohol withdrawal with seizure  Alcohol use disorder  Patient has history of alcohol withdrawal seizures.  Traveled here from [place] for work, and is 2 days from last alcoholic drink, which precipitated a withdrawal seizure. He was witnessed to have a seizure at work, and was brought into the ER. There are some previous notes in care everywhere.  Outside hospital head CT from his last admission 9/2021 did not show any acute pathology.  His urine tox screen here is negative. Appears improving with PRN valium. Continues to have alcohol withdrawal on 7/14.  - Continue seizure precautions  - CIWA protocol with symptom triggered diazepam dosing  - Gabapentin taper while hospitalized per protocol  - IV multivitamin, followed by p.o. folate, thiamine, multivitamin  - Replace electrolytes per protocol  - Protonix daily  - Advance diet as tolerated  - Psych/chemical dependency and SW consult pending    Tongue laceration  Continues to be able to tolerate PO intake today  - ADAT  - Tylenol prn.    Hypokalemia, hypomagnesemia, hypophosphatemia  Potassium 2.9.  Magnesium 1.5, phosphorus 1.9.  Likely related to alcohol use disorder.  - Ordered electrolyte replacement protocols    Elevated LFTs, likely alcoholic hepatitis  Thrombocytopenia and leukopenia due to alcohol use  Bilirubin rising 7/14 but  AST/ALT improving  - IV hydration, treat alcohol withdrawal as above.  - Encourage alcohol cessation    Hypertension  Hyperlipidemia  - Awaiting pharmacy reconciliation, would plan to resume PTA lisinopril--pharm rec still pending  - Hold PTA atorvastatin  - PRN IV labetalol and hydralazine for SBP greater than 180    DVT Prophylaxis: Pneumatic Compression Devices  Code Status: Full Code  PT/OT: not needed currently  Diet: Mechanical/Dental Soft Diet      Disposition: Expected discharge 2-3 days pending improving withdrawal    - I spent 35 minutes, with greater than 50% spent in counseling and coordination of care with the patient and friend Reymundo, regarding alcohol use, withdrawal, management.    Eleno Fam MD, MD  Text Page  (7am to 6pm)  -Data reviewed today: I reviewed all new labs and imaging results over the last 24 hours.    Physical Exam   Temp: 99.3  F (37.4  C) Temp src: Axillary BP: (!) 144/93 Pulse: 89   Resp: 17 SpO2: 94 % O2 Device: None (Room air)    Vitals:    07/13/22 0947 07/13/22 1900   Weight: 99.8 kg (220 lb) 99.7 kg (219 lb 12.8 oz)     Vital Signs with Ranges  Temp:  [98.3  F (36.8  C)-99.3  F (37.4  C)] 99.3  F (37.4  C)  Pulse:  [58-89] 89  Resp:  [9-19] 17  BP: (133-164)/() 144/93  SpO2:  [93 %-98 %] 94 %  No intake/output data recorded.  O2 requirements: no    Constitutional: Male appears weak and mildly tremulous  HEENT: Eyes nonicteric, noted horizontal nystagmus 2-3 beats, no vertical nystagmus. Tongue enlarged and swollen and bruised with some tongue tremors  Cardiovascular: RRR, normal S1/2, no m/r/g  Respiratory: CTAB, no wheezing or crackles  Vascular: No LE pitting edema  GI: Normoactive bowel sounds, nontender  Skin/Integumen: Small abrasions lower extremities  Neuro/Psych: Appropriate affect and mood. A&Ox3, moves all extremities, tremor on outstretched hands, no asterixis    Medications     0.9% sodium chloride + KCl 20 mEq/L 125 mL/hr at 07/14/22 0442        cloNIDine  0.1 mg Oral Q8H     famotidine  20 mg Intravenous Q12H     folic acid  1 mg Oral Daily     [START ON 7/20/2022] gabapentin  100 mg Oral Q8H     [START ON 7/18/2022] gabapentin  300 mg Oral Q8H     [START ON 7/16/2022] gabapentin  600 mg Oral Q8H     gabapentin  900 mg Oral Q8H     multivitamin w/minerals  1 tablet Oral Daily     nicotine  1 patch Transdermal Daily     nicotine   Transdermal Q8H     pantoprazole  40 mg Oral QAM AC     sodium chloride (PF)  3 mL Intracatheter Q8H     thiamine  100 mg Oral Daily       Data   Recent Labs   Lab 07/14/22  0736 07/14/22  0038 07/13/22  1815 07/13/22  1041 07/13/22  1041 07/13/22  1010   WBC 3.1*  --   --   --   --  3.9*   HGB 15.1  --   --   --   --  14.5   *  --   --   --   --  96   PLT 51*  --   --   --   --  72*   INR 1.24*  --   --   --   --   --    *  --   --   --  133  --    POTASSIUM 3.9 3.6 3.3*   < > 2.9*  --    CHLORIDE 101  --   --   --  98  --    CO2 23  --   --   --  24  --    BUN 7  --   --   --  6*  --    CR 0.63*  --   --   --  0.68  --    ANIONGAP 8  --   --   --  11  --    ZAIRA 9.1  --   --   --  9.0  --    *  --   --   --  129*  --    ALBUMIN 3.5  --   --   --  3.7  --    PROTTOTAL 7.9  --   --   --  8.2  --    BILITOTAL 3.0*  --   --   --  2.3*  --    ALKPHOS 65  --   --   --  64  --    *  --   --   --  159*  --    *  --   --   --  271*  --     < > = values in this interval not displayed.       Imaging:   No results found for this or any previous visit (from the past 24 hour(s)).

## 2022-07-15 VITALS
TEMPERATURE: 97.3 F | RESPIRATION RATE: 18 BRPM | OXYGEN SATURATION: 96 % | BODY MASS INDEX: 29.77 KG/M2 | HEART RATE: 70 BPM | DIASTOLIC BLOOD PRESSURE: 97 MMHG | WEIGHT: 219.8 LBS | SYSTOLIC BLOOD PRESSURE: 127 MMHG | HEIGHT: 72 IN

## 2022-07-15 PROCEDURE — 250N000013 HC RX MED GY IP 250 OP 250 PS 637: Performed by: INTERNAL MEDICINE

## 2022-07-15 PROCEDURE — 250N000013 HC RX MED GY IP 250 OP 250 PS 637: Performed by: EMERGENCY MEDICINE

## 2022-07-15 PROCEDURE — 99239 HOSP IP/OBS DSCHRG MGMT >30: CPT | Performed by: INTERNAL MEDICINE

## 2022-07-15 PROCEDURE — 250N000013 HC RX MED GY IP 250 OP 250 PS 637: Performed by: PHYSICIAN ASSISTANT

## 2022-07-15 PROCEDURE — 250N000011 HC RX IP 250 OP 636: Performed by: EMERGENCY MEDICINE

## 2022-07-15 PROCEDURE — 250N000011 HC RX IP 250 OP 636: Performed by: PHYSICIAN ASSISTANT

## 2022-07-15 RX ORDER — LEVETIRACETAM 500 MG/1
500 TABLET ORAL 2 TIMES DAILY
Qty: 14 TABLET | Refills: 0 | Status: SHIPPED | OUTPATIENT
Start: 2022-07-15 | End: 2022-07-22

## 2022-07-15 RX ORDER — FAMOTIDINE 20 MG/1
20 TABLET, FILM COATED ORAL 2 TIMES DAILY
Status: DISCONTINUED | OUTPATIENT
Start: 2022-07-15 | End: 2022-07-15 | Stop reason: HOSPADM

## 2022-07-15 RX ORDER — ACAMPROSATE CALCIUM 333 MG/1
666 TABLET, DELAYED RELEASE ORAL 3 TIMES DAILY
Qty: 42 TABLET | Refills: 0 | Status: SHIPPED | OUTPATIENT
Start: 2022-07-15

## 2022-07-15 RX ORDER — CLONIDINE 0.2 MG/24H
1 PATCH, EXTENDED RELEASE TRANSDERMAL WEEKLY
Status: DISCONTINUED | OUTPATIENT
Start: 2022-07-15 | End: 2022-07-15

## 2022-07-15 RX ORDER — GABAPENTIN 300 MG/1
600 CAPSULE ORAL EVERY 8 HOURS
Qty: 180 CAPSULE | Refills: 0 | Status: SHIPPED | OUTPATIENT
Start: 2022-07-16 | End: 2022-08-15

## 2022-07-15 RX ORDER — MULTIPLE VITAMINS W/ MINERALS TAB 9MG-400MCG
1 TAB ORAL DAILY
Qty: 7 TABLET | Refills: 0 | Status: SHIPPED | OUTPATIENT
Start: 2022-07-16 | End: 2022-07-23

## 2022-07-15 RX ADMIN — CLONIDINE HYDROCHLORIDE 0.1 MG: 0.1 TABLET ORAL at 03:37

## 2022-07-15 RX ADMIN — FAMOTIDINE 20 MG: 10 INJECTION INTRAVENOUS at 03:36

## 2022-07-15 RX ADMIN — POTASSIUM CHLORIDE AND SODIUM CHLORIDE: 900; 150 INJECTION, SOLUTION INTRAVENOUS at 07:26

## 2022-07-15 RX ADMIN — PANTOPRAZOLE SODIUM 40 MG: 40 TABLET, DELAYED RELEASE ORAL at 06:18

## 2022-07-15 RX ADMIN — NICOTINE 1 PATCH: 14 PATCH, EXTENDED RELEASE TRANSDERMAL at 07:28

## 2022-07-15 RX ADMIN — GABAPENTIN 900 MG: 300 CAPSULE ORAL at 06:17

## 2022-07-15 RX ADMIN — MULTIPLE VITAMINS W/ MINERALS TAB 1 TABLET: TAB at 07:27

## 2022-07-15 RX ADMIN — FOLIC ACID 1 MG: 1 TABLET ORAL at 07:27

## 2022-07-15 RX ADMIN — THIAMINE HCL TAB 100 MG 100 MG: 100 TAB at 07:28

## 2022-07-15 RX ADMIN — FAMOTIDINE 20 MG: 20 TABLET ORAL at 08:40

## 2022-07-15 RX ADMIN — LEVETIRACETAM 500 MG: 500 TABLET, FILM COATED ORAL at 08:40

## 2022-07-15 ASSESSMENT — ACTIVITIES OF DAILY LIVING (ADL)
ADLS_ACUITY_SCORE: 22
ADLS_ACUITY_SCORE: 23

## 2022-07-15 NOTE — PLAN OF CARE
Goal Outcome Evaluation:    Summary: ETOH/seizure/tongue laceration  and swelling  DATE & TIME: 7/14/22 6435-1059  Cognitive Concerns/ Orientation: A&Ox4   BEHAVIOR & AGGRESSION TOOL COLOR: Green  CIWA SCORE: 4,3,6,11  ABNL VS/O2: VSS on RA  MOBILITY: Assist x1, bed alarm in place  PAIN MANAGMENT: Denies  DIET: Mechanical soft diet  BOWEL/BLADDER: Urinal at bedside  ABNL LAB/BG: K 3.9, MG 1.9 & Phosphorus 2.5 - redraws ordered for tomorrow AM,  ,   DRAIN/DEVICES: R-PIV infusing NS + Kcl 20mEq- pt pulled out IV, flying squad called after attempts waiting for placement  TELEMETRY RHYTHM: NSR  SKIN: Flushed, tongue laceration/swelling. Refused full skin assessment.  TESTS/PROCEDURES: None  D/C DAY/GOALS/PLACE: Pending improvement   OTHER IMPORTANT INFO: Psych consult pending, seizure pads in place, educated pt to call before getting out of bed. Pts wife was updated, paged MD to update family wife still waiting to hear back

## 2022-07-15 NOTE — DISCHARGE SUMMARY
[facility]    Hospitalist Discharge Summary       Date of Admission:  7/13/2022  Date of Discharge:  7/15/2022  Discharging Provider: Eleno Fam MD      Discharge Diagnoses   Alcohol withdrawal with alcohol withdrawal seizure  Alcohol use disorder  Tongue laceration  Elevated LFTs, improved  Thrombocytopenia and Leukopenia    Follow-ups Needed After Discharge   Follow-up Appointments     Follow-up and recommended labs and tests       Follow up with primary care provider, in [place], within 7 days for   hospital follow- up. Check comprehensive metabolic panel at follow up in   one week.           Hospital Course   Noble Carty is a 41 year old male with PMH hypertension, hyperlipidemia, obesity, hepatic steatosis, alcohol use disorder, alcohol withdrawal seizures who was admitted on 7/13/2022 with alcohol withdrawal and alcohol withdrawal seizures.   Patient has history of alcohol withdrawal seizures.  Traveled here from [place] for work, and was 2 days from last alcoholic drink, which precipitated a withdrawal seizure. He was witnessed to have a seizure at work, and was brought into the ER. There are some previous notes in care everywhere.  Outside hospital head CT from his last admission 9/2021 did not show any acute pathology.  His urine tox screen here is negative. Appears improving with PRN valium. Continues to have alcohol withdrawal on 7/14, but notably improved on 7/15.   Appreciate Addiction Medicine consultation this hospital stay. They recommended Keppra for 7 days for seizure prophylaxis as patient will be traveling by plane back to [place].  - Gabapentin 600mg TID at discharge  - Acamprosate at discharge--patient agreed to continue taking  - Follow up with PCP  - Encouraged complete alcohol cessation    Tongue laceration: No respiratory compromise, tolerating soft diet.    Elevated LFTs, likely alcoholic hepatitis  Thrombocytopenia and leukopenia due to  alcohol use  Bilirubin rising 7/14 but AST/ALT improving. Suspect labs will improve with alcohol cessation.  - Recheck CMP, CBC in one week    Consultations This Hospital Stay   CARE MANAGEMENT / SOCIAL WORK IP CONSULT  PSYCHIATRY IP CONSULT  ADDICTION MEDICINE INPATIENT CONSULT    Code Status   Full Code    Time Spent on this Encounter   I, Eleno Fam, personally saw the patient today and spent approximately 35 minutes discharging this patient. Wife Janneth called and not answered x2 and message left.       Eleno Fam MD  [facility]  ______________________________________________________________________    Physical Exam   Vital Signs: Temp: 97.3  F (36.3  C) Temp src: Axillary BP: (!) 127/97 Pulse: 70   Resp: 18 SpO2: 96 % O2 Device: None (Room air)    Weight: 219 lbs 12.78 oz    Constitutional: Male in NAD  HEENT: Eyes nonicteric, no horizontal nystagmus, no vertical nystagmus. Tongue enlarged and swollen and bruised, no fasciculations  Cardiovascular: RRR, normal S1/2, no m/r/g  Respiratory: CTAB, no wheezing or crackles  Vascular: No LE pitting edema  GI: Normoactive bowel sounds, nontender  Skin/Integumen: Small abrasions lower extremities  Neuro/Psych: Appropriate affect and mood. A&Ox3, moves all extremities, not tremulous       Primary Care Physician   Physician No Ref-Primary    Discharge Disposition   Discharged to home  Condition at discharge: Stable    Significant Results and Procedures   Most Recent 3 CBC's:  Recent Labs   Lab Test 07/14/22  0736 07/13/22  1010   WBC 3.1* 3.9*   HGB 15.1 14.5   * 96   PLT 51* 72*     Most Recent 3 BMP's:  Recent Labs   Lab Test 07/14/22  0736 07/14/22  0038 07/13/22  1815 07/13/22  1041   *  --   --  133   POTASSIUM 3.9 3.6 3.3* 2.9*   CHLORIDE 101  --   --  98   CO2 23  --   --  24   BUN 7  --   --  6*   CR 0.63*  --   --  0.68   ANIONGAP 8  --   --  11   ZAIRA 9.1  --   --  9.0   *  --   --  129*     Most Recent 2  LFT's:  Recent Labs   Lab Test 07/14/22  0736 07/13/22  1041   * 271*   * 159*   ALKPHOS 65 64   BILITOTAL 3.0* 2.3*     Most Recent 3 INR's:  Recent Labs   Lab Test 07/14/22  0736   INR 1.24*     Most Recent 3 Troponin's:No lab results found.  Most Recent 3 BNP's:No lab results found.  Most Recent D-dimer:No lab results found.  Most Recent Cholesterol Panel:No lab results found.    No results found for this or any previous visit.    Discharge Orders      Reason for your hospital stay    You were hospitalized for alcohol withdrawal and seizure. You also have a tongue laceration.     Follow-up and recommended labs and tests     Follow up with primary care provider, in [place], within 7 days for hospital follow- up. Check comprehensive metabolic panel at follow up in one week.     Activity    Your activity upon discharge: activity as tolerated     Diet    Follow this diet upon discharge:       Mechanical/Dental Soft Diet     Discharge Medications   Current Discharge Medication List      START taking these medications    Details   gabapentin (NEURONTIN) 300 MG capsule Take 2 capsules (600 mg) by mouth every 8 hours for 30 days  Qty: 180 capsule, Refills: 0    Associated Diagnoses: Alcohol withdrawal seizure with complication, with unspecified complication (H)      levETIRAcetam (KEPPRA) 500 MG tablet Take 1 tablet (500 mg) by mouth 2 times daily for 7 days  Qty: 14 tablet, Refills: 0    Associated Diagnoses: Alcohol withdrawal seizure with complication, with unspecified complication (H)      multivitamin w/minerals (THERA-VIT-M) tablet Take 1 tablet by mouth daily for 7 days  Qty: 7 tablet, Refills: 0    Associated Diagnoses: Alcohol withdrawal seizure with complication, with unspecified complication (H)         CONTINUE these medications which have CHANGED    Details   acamprosate (CAMPRAL) 333 MG EC tablet Take 2 tablets (666 mg) by mouth 3 times daily  Qty: 42 tablet, Refills: 0    Associated  Diagnoses: Alcohol withdrawal seizure with complication, with unspecified complication (H)         CONTINUE these medications which have NOT CHANGED    Details   acetaminophen (TYLENOL) 325 MG tablet Take 650 mg by mouth 2 times daily as needed for mild pain      atorvastatin (LIPITOR) 20 MG tablet Take 20 mg by mouth daily      cholecalciferol (VITAMIN D3) 125 mcg (5000 units) capsule Take 125 mcg by mouth      co-enzyme Q-10 100 MG CAPS capsule Take 100 mg by mouth      escitalopram (LEXAPRO) 10 MG tablet Take 10 mg by mouth daily      fish oil-omega-3 fatty acids 1000 MG capsule       folic acid (FOLVITE) 1 MG tablet Take 2 mg by mouth daily      lisinopril (ZESTRIL) 10 MG tablet Take 10 mg by mouth daily      magnesium oxide (MAG-OX) 400 MG tablet Take 400 mg by mouth 2 times daily      melatonin 3 MG tablet Take 1 mg by mouth nightly as needed for sleep      Multiple Vitamin (THERAPEUTIC MULTIVITAMIN PO) Take 1 tablet by mouth daily      pantoprazole (PROTONIX) 40 MG EC tablet Take 40 mg by mouth daily      potassium chloride ER (KLOR-CON M) 20 MEQ CR tablet Take 20 mEq by mouth daily      sildenafil (VIAGRA) 100 MG tablet Take 100 mg by mouth daily as needed (ED)      thiamine (B-1) 100 MG tablet Take 100 mg by mouth 2 times daily      traZODone (DESYREL) 50 MG tablet Take 50 mg by mouth At Bedtime      vitamin E (TOCOPHEROL) 400 units (180 mg) capsule Take 400 Units by mouth daily           Allergies   No Known Allergies

## 2022-07-15 NOTE — PROGRESS NOTES
Discharge    Patient discharged to home (catchiong a flight today)  via transport with friend  Care plan note pt oriented and walking adequately- not in withdrawal currently. Changed and belongings packed. meds sent with patient from pharmacy.    Listed belongings gathered and given to patient (including from security/pharmacy). Yes  Care Plan and Patient education resolved: Yes  Prescriptions if needed, hard copies sent with patient  NA  Medication Bin checked and emptied on discharge Yes  SW/care coordinator/charge RN aware of discharge: Yes

## 2022-07-15 NOTE — PLAN OF CARE
Goal Outcome Evaluation:                    Summary: ETOH/seizure/tongue laceration  and swelling  DATE & TIME: 7/14/22-7/15/22 1104-1152  Cognitive Concerns/ Orientation: A&Ox4   BEHAVIOR & AGGRESSION TOOL COLOR: Green  CIWA SCORE: 3, 5  ABNL VS/O2: VSS on RA  MOBILITY: Assist x1, bed alarm in place  PAIN MANAGMENT: Denies  DIET: Mechanical soft diet  BOWEL/BLADDER: Urinal at bedside  ABNL LAB/BG: K 3.9, MG 1.9, Phosphorus 2.5 - recheck in AM,  , , Na 132  DRAIN/DEVICES: R-PIV infusing NS + Kcl 20mEq @125mL/hr  TELEMETRY RHYTHM: NSR  SKIN: Flushed, tongue laceration/swelling. Refused full skin assessment.  TESTS/PROCEDURES: None  D/C DAY/GOALS/PLACE: Pending improvement   OTHER IMPORTANT INFO: Psych consult pending, seizure pads in place, educated pt to call before getting out of bed. Pts wife was updated, paged MD to update family wife still waiting to hear back